# Patient Record
Sex: FEMALE | Race: OTHER | NOT HISPANIC OR LATINO | URBAN - METROPOLITAN AREA
[De-identification: names, ages, dates, MRNs, and addresses within clinical notes are randomized per-mention and may not be internally consistent; named-entity substitution may affect disease eponyms.]

---

## 2017-11-17 ENCOUNTER — EMERGENCY (EMERGENCY)
Facility: HOSPITAL | Age: 42
LOS: 1 days | Discharge: HOME | End: 2017-11-17
Attending: EMERGENCY MEDICINE | Admitting: EMERGENCY MEDICINE
Payer: SELF-PAY

## 2017-11-17 VITALS
RESPIRATION RATE: 16 BRPM | TEMPERATURE: 98 F | OXYGEN SATURATION: 100 % | HEART RATE: 100 BPM | SYSTOLIC BLOOD PRESSURE: 149 MMHG | DIASTOLIC BLOOD PRESSURE: 93 MMHG

## 2017-11-17 DIAGNOSIS — J45.901 UNSPECIFIED ASTHMA WITH (ACUTE) EXACERBATION: ICD-10-CM

## 2017-11-17 PROCEDURE — 99284 EMERGENCY DEPT VISIT MOD MDM: CPT

## 2017-11-17 RX ORDER — ALBUTEROL 90 UG/1
1 AEROSOL, METERED ORAL ONCE
Qty: 0 | Refills: 0 | Status: COMPLETED | OUTPATIENT
Start: 2017-11-17 | End: 2017-11-17

## 2017-11-17 RX ADMIN — ALBUTEROL 1 PUFF(S): 90 AEROSOL, METERED ORAL at 16:41

## 2017-11-17 NOTE — ED PROVIDER NOTE - OBJECTIVE STATEMENT
42y F with hx of asthma presents to ED accompanied by police for medication refill. Pt states she takes a puff of albuterol every night, but recently ran out. Last asthma attack was more than 2 weeks ago.

## 2024-04-09 NOTE — ED PROVIDER NOTE - NEURO NEGATIVE STATEMENT, MLM
04/09/24 1400   Recommended Level of Care    Recommended Level of Care PHP   Type of Treatment Recommended Mental Health   Place of Treatment Recommended [facility]   Comments Writer consulted with Dr. Ames regarding patient's report of recent suicide attempt ( overdose of pain medication in addition to taking sleeping pills on3/29/24,patient reports using narcan and calling a friend , though she did not seek any additional treatment after this attempt. Patient was later seen 1 week later by her individual therapist who she says suggested she go for an assessment at [facility] patient  reports ongoing history of treatment for depression since 2002,and reports ongoing ' fleeting thoughts of si, though she insisted she will not act on the thoughts , has a safety plan in place and reports her family and friends are most important to her and worth living for. Dr. Ames suggested [facility], as the patient reports ability to maintain her safety, has support, and reports will use her safety plan if unable to maintain her safety.        no loss of consciousness, no gait abnormality, no headache, no sensory deficits, and no weakness.